# Patient Record
Sex: MALE | Race: WHITE | NOT HISPANIC OR LATINO
[De-identification: names, ages, dates, MRNs, and addresses within clinical notes are randomized per-mention and may not be internally consistent; named-entity substitution may affect disease eponyms.]

---

## 2018-01-02 ENCOUNTER — APPOINTMENT (OUTPATIENT)
Dept: INTERNAL MEDICINE | Facility: CLINIC | Age: 63
End: 2018-01-02

## 2018-01-02 ENCOUNTER — OUTPATIENT (OUTPATIENT)
Dept: OUTPATIENT SERVICES | Facility: HOSPITAL | Age: 63
LOS: 1 days | Discharge: HOME | End: 2018-01-02

## 2018-01-02 VITALS
BODY MASS INDEX: 26.2 KG/M2 | DIASTOLIC BLOOD PRESSURE: 89 MMHG | WEIGHT: 183 LBS | HEIGHT: 70 IN | SYSTOLIC BLOOD PRESSURE: 138 MMHG | HEART RATE: 69 BPM

## 2018-01-02 DIAGNOSIS — Z00.00 ENCOUNTER FOR GENERAL ADULT MEDICAL EXAMINATION W/OUT ABNORMAL FINDINGS: ICD-10-CM

## 2018-01-02 DIAGNOSIS — D22.9 MELANOCYTIC NEVI, UNSPECIFIED: ICD-10-CM

## 2018-01-02 DIAGNOSIS — I10 ESSENTIAL (PRIMARY) HYPERTENSION: ICD-10-CM

## 2018-01-02 DIAGNOSIS — R05 COUGH: ICD-10-CM

## 2018-01-02 DIAGNOSIS — Z87.891 PERSONAL HISTORY OF NICOTINE DEPENDENCE: ICD-10-CM

## 2018-01-05 ENCOUNTER — RESULT REVIEW (OUTPATIENT)
Age: 63
End: 2018-01-05

## 2018-01-06 ENCOUNTER — OUTPATIENT (OUTPATIENT)
Dept: OUTPATIENT SERVICES | Facility: HOSPITAL | Age: 63
LOS: 1 days | Discharge: HOME | End: 2018-01-06

## 2018-01-06 DIAGNOSIS — R91.8 OTHER NONSPECIFIC ABNORMAL FINDING OF LUNG FIELD: ICD-10-CM

## 2018-01-06 LAB
ALBUMIN SERPL-MCNC: 4.1 G/DL
ALBUMIN/GLOB SERPL: 1.78
ALP SERPL-CCNC: 82 IU/L
ALT SERPL-CCNC: 14 IU/L
ANION GAP SERPL CALC-SCNC: 8 MEQ/L
AST SERPL-CCNC: 21 IU/L
BASOPHILS # BLD: 0.05 TH/MM3
BASOPHILS NFR BLD: 0.6 %
BILIRUB SERPL-MCNC: 1.5 MG/DL
BUN SERPL-MCNC: 13 MG/DL
BUN/CREAT SERPL: 14.1 %
CALCIUM SERPL-MCNC: 9.1 MG/DL
CHLORIDE SERPL-SCNC: 106 MEQ/L
CHOLEST SERPL-MCNC: 228 MG/DL
CO2 SERPL-SCNC: 27 MEQ/L
CREAT SERPL-MCNC: 0.92 MG/DL
DIFFERENTIAL METHOD BLD: NORMAL
EOSINOPHIL # BLD: 0.72 TH/MM3
EOSINOPHIL NFR BLD: 9.1 %
ERYTHROCYTE [DISTWIDTH] IN BLOOD BY AUTOMATED COUNT: 14.4 %
ESTIMATED AVERGAGE GLUCOSE (NORTH): 100 MG/DL
GFR SERPL CREATININE-BSD FRML MDRD: 83
GLUCOSE SERPL-MCNC: 84 MG/DL
GRANULOCYTES # BLD: 3.75 TH/MM3
GRANULOCYTES NFR BLD: 47.7 %
HBA1C MFR BLD: 5.1 %
HCT VFR BLD AUTO: 45.6 %
HDLC SERPL-MCNC: 42 MG/DL
HDLC SERPL: 5.43
HGB BLD-MCNC: 15.4 G/DL
IMM GRANULOCYTES # BLD: 0.01 TH/MM3
IMM GRANULOCYTES NFR BLD: 0.1 %
LDLC SERPL DIRECT ASSAY-MCNC: 145 MG/DL
LYMPHOCYTES # BLD: 2.67 TH/MM3
LYMPHOCYTES NFR BLD: 33.8 %
MCH RBC QN AUTO: 29.6 PG
MCHC RBC AUTO-ENTMCNC: 33.8 G/DL
MCV RBC AUTO: 87.5 FL
MONOCYTES # BLD: 0.69 TH/MM3
MONOCYTES NFR BLD: 8.7 %
PLATELET # BLD: 221 TH/MM3
PMV BLD AUTO: 10 FL
POTASSIUM SERPL-SCNC: 4.2 MMOL/L
PROT SERPL-MCNC: 6.4 G/DL
RBC # BLD AUTO: 5.21 MIL/MM3
SODIUM SERPL-SCNC: 141 MEQ/L
TRIGL SERPL-MCNC: 189 MG/DL
VLDLC SERPL-MCNC: 37 MG/DL
WBC # BLD: 7.89 TH/MM3

## 2018-01-08 LAB — TSH SERPL DL<=0.005 MIU/L-ACNC: 1.9 UIU/ML

## 2018-01-10 LAB
25(OH)D3 SERPL-MCNC: 25 NG/ML
VITAMIN D2 SERPL-MCNC: <4 NG/ML
VITAMIN D3 SERPL-MCNC: 25 NG/ML

## 2018-02-12 ENCOUNTER — OUTPATIENT (OUTPATIENT)
Dept: OUTPATIENT SERVICES | Facility: HOSPITAL | Age: 63
LOS: 1 days | Discharge: HOME | End: 2018-02-12

## 2018-02-12 ENCOUNTER — APPOINTMENT (OUTPATIENT)
Dept: INTERNAL MEDICINE | Facility: CLINIC | Age: 63
End: 2018-02-12

## 2018-02-12 VITALS
DIASTOLIC BLOOD PRESSURE: 99 MMHG | HEART RATE: 76 BPM | HEIGHT: 70 IN | BODY MASS INDEX: 26.2 KG/M2 | SYSTOLIC BLOOD PRESSURE: 155 MMHG | WEIGHT: 183 LBS

## 2018-02-12 DIAGNOSIS — I10 ESSENTIAL (PRIMARY) HYPERTENSION: ICD-10-CM

## 2018-02-12 DIAGNOSIS — R05 COUGH: ICD-10-CM

## 2018-02-12 DIAGNOSIS — E78.00 PURE HYPERCHOLESTEROLEMIA, UNSPECIFIED: ICD-10-CM

## 2018-05-14 ENCOUNTER — OUTPATIENT (OUTPATIENT)
Dept: OUTPATIENT SERVICES | Facility: HOSPITAL | Age: 63
LOS: 1 days | Discharge: HOME | End: 2018-05-14

## 2018-05-14 ENCOUNTER — APPOINTMENT (OUTPATIENT)
Dept: INTERNAL MEDICINE | Facility: CLINIC | Age: 63
End: 2018-05-14

## 2018-05-14 VITALS
BODY MASS INDEX: 26.48 KG/M2 | HEIGHT: 70 IN | WEIGHT: 185 LBS | DIASTOLIC BLOOD PRESSURE: 78 MMHG | HEART RATE: 68 BPM | SYSTOLIC BLOOD PRESSURE: 122 MMHG

## 2018-05-14 DIAGNOSIS — J45.20 MILD INTERMITTENT ASTHMA, UNCOMPLICATED: ICD-10-CM

## 2018-05-14 DIAGNOSIS — R05 COUGH: ICD-10-CM

## 2018-05-14 DIAGNOSIS — E78.5 HYPERLIPIDEMIA, UNSPECIFIED: ICD-10-CM

## 2018-05-14 DIAGNOSIS — E55.9 VITAMIN D DEFICIENCY, UNSPECIFIED: ICD-10-CM

## 2018-05-14 DIAGNOSIS — I10 ESSENTIAL (PRIMARY) HYPERTENSION: ICD-10-CM

## 2018-05-14 RX ORDER — MELATONIN 3 MG
25 MCG TABLET ORAL
Qty: 90 | Refills: 0 | Status: ACTIVE | COMMUNITY
Start: 2018-02-12 | End: 1900-01-01

## 2018-05-14 RX ORDER — NIFEDIPINE 30 MG/1
30 TABLET, EXTENDED RELEASE ORAL DAILY
Qty: 90 | Refills: 0 | Status: ACTIVE | COMMUNITY
Start: 2018-01-02 | End: 1900-01-01

## 2018-05-14 RX ORDER — MONTELUKAST 10 MG/1
10 TABLET, FILM COATED ORAL
Qty: 90 | Refills: 0 | Status: ACTIVE | COMMUNITY
Start: 2018-01-02 | End: 1900-01-01

## 2018-05-14 RX ORDER — ATORVASTATIN CALCIUM 40 MG/1
40 TABLET, FILM COATED ORAL
Qty: 90 | Refills: 0 | Status: ACTIVE | COMMUNITY
Start: 2018-02-12 | End: 1900-01-01

## 2018-05-14 RX ORDER — HYDROCHLOROTHIAZIDE 25 MG/1
25 TABLET ORAL DAILY
Qty: 90 | Refills: 0 | Status: ACTIVE | COMMUNITY
Start: 2018-01-02 | End: 1900-01-01

## 2018-05-14 RX ORDER — ALBUTEROL SULFATE 90 UG/1
108 (90 BASE) AEROSOL, METERED RESPIRATORY (INHALATION) 3 TIMES DAILY
Qty: 3 | Refills: 0 | Status: ACTIVE | COMMUNITY
Start: 2018-01-02 | End: 1900-01-01

## 2018-05-14 RX ORDER — ATENOLOL 100 MG/1
100 TABLET ORAL DAILY
Qty: 90 | Refills: 0 | Status: ACTIVE | COMMUNITY
Start: 2018-01-02 | End: 1900-01-01

## 2018-05-14 RX ORDER — BUDESONIDE AND FORMOTEROL FUMARATE DIHYDRATE 80; 4.5 UG/1; UG/1
80-4.5 AEROSOL RESPIRATORY (INHALATION) TWICE DAILY
Qty: 3 | Refills: 0 | Status: ACTIVE | COMMUNITY
Start: 2018-05-14 | End: 1900-01-01

## 2018-05-14 RX ORDER — OMEPRAZOLE 40 MG/1
40 CAPSULE, DELAYED RELEASE ORAL
Qty: 90 | Refills: 0 | Status: ACTIVE | COMMUNITY
Start: 2018-05-14 | End: 1900-01-01

## 2021-03-13 ENCOUNTER — EMERGENCY (EMERGENCY)
Facility: HOSPITAL | Age: 66
LOS: 0 days | Discharge: HOME | End: 2021-03-13
Attending: EMERGENCY MEDICINE | Admitting: EMERGENCY MEDICINE
Payer: MEDICAID

## 2021-03-13 VITALS
HEIGHT: 69 IN | HEART RATE: 72 BPM | RESPIRATION RATE: 18 BRPM | DIASTOLIC BLOOD PRESSURE: 72 MMHG | TEMPERATURE: 99 F | WEIGHT: 171.96 LBS | OXYGEN SATURATION: 99 % | SYSTOLIC BLOOD PRESSURE: 138 MMHG

## 2021-03-13 DIAGNOSIS — E78.00 PURE HYPERCHOLESTEROLEMIA, UNSPECIFIED: ICD-10-CM

## 2021-03-13 DIAGNOSIS — K08.89 OTHER SPECIFIED DISORDERS OF TEETH AND SUPPORTING STRUCTURES: ICD-10-CM

## 2021-03-13 DIAGNOSIS — I10 ESSENTIAL (PRIMARY) HYPERTENSION: ICD-10-CM

## 2021-03-13 DIAGNOSIS — E78.5 HYPERLIPIDEMIA, UNSPECIFIED: ICD-10-CM

## 2021-03-13 PROCEDURE — 99283 EMERGENCY DEPT VISIT LOW MDM: CPT

## 2021-03-13 RX ORDER — AMOXICILLIN 250 MG/5ML
1 SUSPENSION, RECONSTITUTED, ORAL (ML) ORAL
Qty: 21 | Refills: 0
Start: 2021-03-13 | End: 2021-03-19

## 2021-03-13 RX ORDER — IBUPROFEN 200 MG
600 TABLET ORAL ONCE
Refills: 0 | Status: COMPLETED | OUTPATIENT
Start: 2021-03-13 | End: 2021-03-13

## 2021-03-13 RX ADMIN — Medication 600 MILLIGRAM(S): at 10:00

## 2021-03-13 NOTE — ED PROVIDER NOTE - CARE PLAN
Principal Discharge DX:	Dentalgia   Principal Discharge DX:	Dentalgia  Assessment and plan of treatment:	Plan: pain medication, prescription for abx, reassess.

## 2021-03-13 NOTE — ED PROVIDER NOTE - PROGRESS NOTE DETAILS
ED Attending JOVANNY Haro  Pt aware of symptomatic treatment, tyl and ibu dosing and when to take it, how many hours apart for each, as well as prescription for abx, aware of return to ed on Monday to go to dental clinic as pt does not have dentist now, agree with plan, pt tolerating pot, report will follow up.

## 2021-03-13 NOTE — ED PROVIDER NOTE - ATTENDING CONTRIBUTION TO CARE
64 y/o m w/ pmhx of htn, hld presents for L lower dental pain x ~ 2 days, throbbing, mild, worse with chewing, better at rest, constant, did not take anything for the pain today, similar pain a year ago, saw dentist and was told he may need root canal but due to pain improving with IBU never got it done. not a smoker. tried to make appt with dentist but daughter at bedside reports could not get appt. denies fever, chills, n/v, cp, sob, pleuritic chest pain, palpitations, diaphoresis, cough, drooling/secretions, trismus, neck swelling, neck stiffness, muffled/change in voice, dysphagia, odonoyphagia, drainage, trauma, weakness, numbness/tingling, sick contacts, recent travel or rash.      on exam: wdwn male sitting on stretcher speaking full sentences, no sniffing position, no muffled or hot potatoe voice, no rash, no facial swelling, mmm, pain to palpation to tooth #  17 with no surrounding fluctuance/induration, no halitosis, no trismus, no drooling/secretions, no pain to floor of mouth, no elevation to floor of mouth, no oropharyngeal edema, uvula midline, no PTA, no neck swelling- supple, non-tender, no anterior neck pain, RRR, radial pulses 2/4 b/l, ctabl w/ breath sounds present b/l, no wheezing or crackles, good air exchange, good respiratory effort, no accessory muscle use, no tachypnea, no stridor,  AAOx3. CN II-XII intact, no facial droop or slurring of speech. No focal deficits.

## 2021-03-13 NOTE — ED PROVIDER NOTE - PHYSICAL EXAMINATION
VITALS:  I have reviewed the initial vital signs.  GENERAL: Well-developed, well-nourished, in no acute distress. Nontoxic.  HEENT: MMM, tolerating oral secretions. No trismus. Mild ttp to tooth #17. No abscess. No floor of mouth or submandibular swelling. No tongue elevation.    NECK: supple w FROM.   PULM: Normal effort. No tachypnea or retractions. No stridor.   SKIN: Warm, dry.  NEURO: A&Ox3. Speech clear. CN II-XII intact. No focal deficits.

## 2021-03-13 NOTE — ED PROVIDER NOTE - OBJECTIVE STATEMENT
65 year old male w hx of HTN, HLD presents to the ED for evaluation of constant, mild, non-radiating left lower dental pain x 2 days. Pt had pain to this same tooth 1 year ago, was told by his dentist he may need a root canal but never followed up as his pain resolved. Pain returned yesterday, worse when chewing on that side, improves with Ibuprofen. Denies fevers/chills, facial swelling, sore throat, difficulty swallowing, voice change, difficulty breathing, n/v, recent dental procedures.

## 2021-03-13 NOTE — ED PROVIDER NOTE - CLINICAL SUMMARY MEDICAL DECISION MAKING FREE TEXT BOX
pt aware of plan for dental clinic on Monday, symptomaitc treatment for pain as well as abx, pt in no resp distress, no drooling or secretions, agrees with plan.

## 2021-03-13 NOTE — ED PROVIDER NOTE - NSFOLLOWUPCLINICS_GEN_ALL_ED_FT
[facility]  Dental  [address] [place] [id number]  Phone: [phone]  Fax:   Follow Up Time: 1-3 Days

## 2021-03-13 NOTE — ED PROVIDER NOTE - PATIENT PORTAL LINK FT
You can access the FollowMyHealth Patient Portal offered by [facility] by registering at the following website: [email or web address]. By joining [employer]’s FollowMyHealth portal, you will also be able to view your health information using other applications (apps) compatible with our system.

## 2021-05-03 ENCOUNTER — EMERGENCY (EMERGENCY)
Facility: HOSPITAL | Age: 66
LOS: 0 days | Discharge: HOME | End: 2021-05-03
Attending: EMERGENCY MEDICINE | Admitting: EMERGENCY MEDICINE
Payer: MEDICAID

## 2021-05-03 VITALS
SYSTOLIC BLOOD PRESSURE: 137 MMHG | OXYGEN SATURATION: 99 % | HEIGHT: 69 IN | HEART RATE: 81 BPM | RESPIRATION RATE: 18 BRPM | DIASTOLIC BLOOD PRESSURE: 71 MMHG | TEMPERATURE: 97 F | WEIGHT: 175.05 LBS
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
## 2021-05-03 DIAGNOSIS — E78.00 PURE HYPERCHOLESTEROLEMIA, UNSPECIFIED: ICD-10-CM

## 2021-05-03 DIAGNOSIS — K08.89 OTHER SPECIFIED DISORDERS OF TEETH AND SUPPORTING STRUCTURES: ICD-10-CM

## 2021-05-03 DIAGNOSIS — S02.5XXA FRACTURE OF TOOTH (TRAUMATIC), INITIAL ENCOUNTER FOR CLOSED FRACTURE: ICD-10-CM

## 2021-05-03 DIAGNOSIS — I10 ESSENTIAL (PRIMARY) HYPERTENSION: ICD-10-CM

## 2021-05-03 DIAGNOSIS — K02.9 DENTAL CARIES, UNSPECIFIED: ICD-10-CM

## 2021-05-03 PROCEDURE — 99282 EMERGENCY DEPT VISIT SF MDM: CPT

## 2021-05-03 NOTE — ED PROVIDER NOTE - ATTENDING CONTRIBUTION TO CARE
67yo man c/o L lower dental pain x 1 week without facial swelling or constitutional sx. VS, exam as noted, pt nontoxic appearing. Tenderness and decay at #18. To dental clinic for definitive management.

## 2021-05-03 NOTE — ED ADULT NURSE NOTE - CHIEF COMPLAINT
Post-Care Instructions: I reviewed with the patient in detail post-care instructions. Patient is to wear sunprotection, and avoid picking at any of the treated lesions. Pt may apply Vaseline to crusted or scabbing areas. Consent: The patient's consent was obtained including but not limited to risks of crusting, scabbing, blistering, scarring, darker or lighter pigmentary change, recurrence, incomplete removal and infection. The patient understands that the procedure is cosmetic in nature and is not covered by insurance. Billing Information: Bill by Static Price Render Post-Care Instructions In Note?: no Detail Level: Detailed The patient is a 66y Male complaining of dental pain/injury.

## 2021-05-03 NOTE — ED PROVIDER NOTE - PHYSICAL EXAMINATION
CONST: Well appearing in NAD  EYES: PERRL, EOMI, Sclera and conjunctiva clear.   ENT: Oropharynx normal appearing, no erythema or exudates. #18 is TTP with decay noted. No swelling. Uvula midline.  NECK: Non-tender, no meningeal signs  MS: Normal ROM in all extremities. No midline spinal tenderness.  SKIN: Warm, dry, no acute rashes. Good turgor  NEURO: A&Ox3, No focal deficits. Strength 5/5 with no sensory deficits. Steady gait

## 2021-05-03 NOTE — CONSULT NOTE ADULT - SUBJECTIVE AND OBJECTIVE BOX
ID - [id number]    Patient is a 66y old  Male who presents with a chief complaint of lower left side tooth pain.     HPI: Patient reports lower left side tooth pain for one month. Went to private dentist who referred him to oral surgeon for extraction of lower left molar. Has been unable to coordinate care. Has been on antibiotics, but pain still persists. Reported to [facility] ED due to worsening of pain. Referred to dental medicine for a consult.     PAST MEDICAL & SURGICAL HISTORY:  HTN (hypertension)  High cholesterol    MEDICATIONS  (STANDING): metoprolol, amlodipine, hydrochlorothiazide      MEDICATIONS  (PRN): none     Allergies  No Known Allergies    *SOCIAL HISTORY: ( -  ) Tobacco; ( -  ) ETOH    *Last Dental Visit: last month     Vital Signs Last 24 Hrs  T(C): 36.1 (03 May 2021 10:23), Max: 36.1 (03 May 2021 10:23)  T(F): 97 (03 May 2021 10:23), Max: 97 (03 May 2021 10:23)  HR: 81 (03 May 2021 10:23) (81 - 81)  BP: 137/71 (03 May 2021 10:23) (137/71 - 137/71)  BP(mean): --  RR: 18 (03 May 2021 10:23) (18 - 18)  SpO2: 99% (03 May 2021 10:23) (99% - 99%)    EOE:  TMJ ( -  ) clicks                     ( -  ) pops                     ( -  ) crepitus             Mandible <<FROM>>             Facial bones and MOM <<grossly intact>>             ( -  ) trismus             ( -  ) lymphadenopathy             ( -  ) swelling             ( -  ) asymmetry             ( -  ) palpation             ( -  ) dyspnea             ( -  ) dysphagia             ( -  ) loss of consciousness    IOE:  <<permanent>> dentition: <<multiple missing teeth>>           hard/soft palate:  ( -  ) palatal torus, <<No pathology noted>>           tongue/FOM <<No pathology noted>>           labial/buccal mucosa <<No pathology noted>>           ( +  ) percussion #19            ( -  ) palpation           ( -  ) swelling            ( -  ) abscess           ( -  ) sinus tract    *DENTAL RADIOGRAPHS: 1 PERIAPICAL     *ASSESSMENT: #19 deep restoration, fracture, bone loss- non restorable     *PLAN: Extraction #19     PROCEDURE:   Treatment consequences explained to patient as per OS sheet dated 7/13/00. Consent obtained. Side site marked  Anesthesia: <<   2 carpules of 2% lidocaine with 1:100,000 epinephrine given as block and 1 carpule of 4% septocaine with 1:100,000 epinephrine given as infiltration >>   Treatment: << Tooth #19 elevated and extracted. Curettage. Irrigation. Hemostasis obtained. Post operative x ray taken- negative. Placed 2 3-0 plain gut interrupted sutures.  Post operative instructions given written and verbally. Over the counter medication for pain management. Return to private dentist for comprehensive care.   >>     RECOMMENDATIONS:  1) << Over the counter medication for pain management.  >>  2) Dental F/U with outpatient dentist for comprehensive dental care.   3) If any difficulty swallowing/breathing, fever occur, return to ER.     Resident Name, pager #  Noel Arroyo DDS   [phone]